# Patient Record
Sex: MALE | Race: WHITE | Employment: OTHER | URBAN - METROPOLITAN AREA
[De-identification: names, ages, dates, MRNs, and addresses within clinical notes are randomized per-mention and may not be internally consistent; named-entity substitution may affect disease eponyms.]

---

## 2022-12-07 ENCOUNTER — APPOINTMENT (OUTPATIENT)
Dept: GENERAL RADIOLOGY | Age: 42
End: 2022-12-07
Payer: COMMERCIAL

## 2022-12-07 ENCOUNTER — HOSPITAL ENCOUNTER (EMERGENCY)
Age: 42
Discharge: HOME | End: 2022-12-07
Attending: EMERGENCY MEDICINE
Payer: COMMERCIAL

## 2022-12-07 VITALS
OXYGEN SATURATION: 99 % | TEMPERATURE: 97.8 F | HEART RATE: 99 BPM | BODY MASS INDEX: 26.34 KG/M2 | HEIGHT: 75 IN | WEIGHT: 211.86 LBS | RESPIRATION RATE: 16 BRPM | DIASTOLIC BLOOD PRESSURE: 79 MMHG | SYSTOLIC BLOOD PRESSURE: 129 MMHG

## 2022-12-07 DIAGNOSIS — S61.310A LACERATION OF RIGHT INDEX FINGER WITHOUT FOREIGN BODY WITH DAMAGE TO NAIL, INITIAL ENCOUNTER: Primary | ICD-10-CM

## 2022-12-07 PROCEDURE — 99283 EMERGENCY DEPT VISIT LOW MDM: CPT

## 2022-12-07 PROCEDURE — 73140 X-RAY EXAM OF FINGER(S): CPT

## 2022-12-07 PROCEDURE — 12001 RPR S/N/AX/GEN/TRNK 2.5CM/<: CPT

## 2022-12-07 RX ORDER — DEXTROAMPHETAMINE SACCHARATE, AMPHETAMINE ASPARTATE, DEXTROAMPHETAMINE SULFATE AND AMPHETAMINE SULFATE 3.75; 3.75; 3.75; 3.75 MG/1; MG/1; MG/1; MG/1
15 TABLET ORAL DAILY
COMMUNITY

## 2022-12-07 RX ORDER — SERTRALINE HYDROCHLORIDE 100 MG/1
100 TABLET, FILM COATED ORAL DAILY
COMMUNITY

## 2022-12-07 RX ORDER — HYDROXYZINE PAMOATE 25 MG/1
50 CAPSULE ORAL PRN
COMMUNITY

## 2022-12-07 ASSESSMENT — PAIN DESCRIPTION - FREQUENCY: FREQUENCY: CONTINUOUS

## 2022-12-07 ASSESSMENT — PAIN - FUNCTIONAL ASSESSMENT
PAIN_FUNCTIONAL_ASSESSMENT: 0-10
PAIN_FUNCTIONAL_ASSESSMENT: 0-10

## 2022-12-07 ASSESSMENT — PAIN DESCRIPTION - PAIN TYPE: TYPE: ACUTE PAIN

## 2022-12-07 ASSESSMENT — PAIN SCALES - GENERAL
PAINLEVEL_OUTOF10: 0
PAINLEVEL_OUTOF10: 8

## 2022-12-07 ASSESSMENT — PAIN DESCRIPTION - ORIENTATION: ORIENTATION: RIGHT

## 2022-12-07 ASSESSMENT — PAIN DESCRIPTION - DESCRIPTORS: DESCRIPTORS: ACHING

## 2022-12-07 ASSESSMENT — PAIN DESCRIPTION - LOCATION: LOCATION: FINGER (COMMENT WHICH ONE)

## 2022-12-07 NOTE — DISCHARGE INSTRUCTIONS
Follow-up in 10 to 14 days for removal of sutures. Clean wound twice daily after the first 24 hours. You can cover it with a Band-Aid during the day to keep it clean and dry. You can leave it open to the air at night so that the wound will dry out. Follow-up in 10 to 14 days for removal of sutures. Return at anytime for redness, drainage, fever, signs of infection.

## 2022-12-07 NOTE — ED PROVIDER NOTES
[facility]  eMERGENCY dEPARTMENT eNCOUnter      Pt Name: Lizzie Crowell  MRN: [id number]  [place] 1980  Date of evaluation: 12/7/2022  Provider: Loree Mayfield MD    CHIEF COMPLAINT       Chief Complaint   Patient presents with    Finger Injury     Smashed right index finger with table saw. Laceration tip right index finger          HISTORY OF PRESENT ILLNESS  (Location/Symptom, Timing/Onset, Context/Setting, Quality, Duration, Modifying Factors, Severity.)   Lizzie Crowell is a 36 y.o. male who presents to the emergency department complaining of a laceration to his right index finger sustained at home just prior to coming in. He was using a table saw. The sawblade was turned off but it was still moving. He smashed his tip of his finger between the blade and the guard. He has an injury to the tip of his right index finger. Nursing Notes were reviewed and I agree. REVIEW OF SYSTEMS    (2-9 systems for level 4, 10 or more for level 5)     Musculoskeletal: Pain in the tip of the right index finger. Skin: Laceration of the tip of the right index finger. Neuro: No numbness tingling or weakness. Except as noted above the remainder of the review of systems was reviewed and negative. PAST MEDICAL HISTORY   History reviewed. No pertinent past medical history. SURGICAL HISTORY           Procedure Laterality Date    ARM SURGERY Left     SHOULDER SURGERY Right        CURRENT MEDICATIONS       Previous Medications    AMPHETAMINE-DEXTROAMPHETAMINE (ADDERALL) 15 MG TABLET    Take 15 mg by mouth daily. HYDROXYZINE PAMOATE (VISTARIL) 25 MG CAPSULE    Take 50 mg by mouth as needed for Itching    SERTRALINE (ZOLOFT) 100 MG TABLET    Take 100 mg by mouth daily       ALLERGIES     Patient has no known allergies. FAMILY HISTORY     History reviewed. No pertinent family history. No family status information on file.         SOCIAL HISTORY      reports that he has been smoking cigarettes. He has never used smokeless tobacco. He reports current alcohol use. He reports that he does not currently use drugs. PHYSICAL EXAM    (up to 7 for level 4, 8 or more for level 5)     ED Triage Vitals   BP Temp Temp src Pulse Resp SpO2 Height Weight   -- -- -- -- -- -- -- --       General: Alert male no acute distress. Musculoskeletal: There is no swelling or deformity of the right wrist, right hand, digits in the right hand. There is tenderness to the distal phalanx of the right index finger. He has intact extension of the MCP, PIP, and DIP of the index finger. He has intact flexion at the MCP, PIP, DIP. Full range of motion of all other digits. Skin: There is a 1 cm laceration over the ulnar aspect of the distal phalanx of the right index finger about midway down the nail plate. There is a fracture through the nail plate on the dorsal radial aspect of the distal nail plate diagonally situated as pictured below. Neuro: Intact motor function sensation right upper extremity. Dorsal Right Index    Radial Right Index  DIAGNOSTIC RESULTS     RADIOLOGY:   Non-plain film images such as CT, Ultrasound and MRI are read by the radiologist. Plain radiographic images are visualized and preliminarily interpreted by James Andino MD with the below findings:      Interpretation per the Radiologist below, if available at the time of this note:    XR FINGER RIGHT (MIN 2 VIEWS)   Final Result   Laceration of the tip of the index finger. No radiopaque foreign body. No   fracture. LABS:  Labs Reviewed - No data to display    All other labs were within normal range or not returned as of this dictation.     EMERGENCY DEPARTMENT COURSE and DIFFERENTIAL DIAGNOSIS/MDM:   Vitals:    Vitals:    12/07/22 1453   BP: 129/79   Pulse: 99   Resp: 16   Temp: 97.8 °F (36.6 °C)   TempSrc: Tympanic   SpO2: 99%   Weight: 211 lb 13.8 oz (96.1 kg)   Height: 6' 3\" (1.905 m)       This patient presents with an injury to the tip of his right index finger. He smashed it between the guard on a table saw on the blade. The blade had been turned off but it was still spinning. He has a fracture to the distal nail plate with a laceration that extends onto the radial aspect of the distal phalanx as pictured above. His x-ray shows no evidence of fracture or foreign body. A metacarpal block was done. The distal part of the nail plate was debrided. The skin was closed with five 4-0 nylon sutures. The nailbed was closed with two 5-0 chromic sutures. A dressing was placed. His tetanus is up-to-date.   He will follow-up in 10 to 14 days for suture removal.  He will return as needed for any redness, drainage, signs of infection    PROCEDURES:  Lac Repair    Date/Time: 12/7/2022 3:28 PM  Performed by: Joaquin Jasso MD  Authorized by: Joaquin Jasso MD     Consent:     Consent obtained:  Verbal    Consent given by:  Patient    Risks, benefits, and alternatives were discussed: yes      Risks discussed:  Infection, need for additional repair, nerve damage, poor wound healing, poor cosmetic result, pain, retained foreign body, tendon damage and vascular damage  Universal protocol:     Procedure explained and questions answered to patient or proxy's satisfaction: yes      Test results available: yes      Patient identity confirmed:  Verbally with patient and arm band  Anesthesia:     Anesthesia method:  Nerve block    Block location:  Right index finger    Block needle gauge:  25 G    Block anesthetic:  Bupivacaine 0.5% w/o epi and lidocaine 1% w/o epi    Block technique:  Metacarpal    Block injection procedure:  Anatomic landmarks identified, introduced needle, incremental injection and negative aspiration for blood    Block outcome:  Anesthesia achieved  Laceration details:     Location:  Finger    Finger location:  R index finger    Length (cm):  1.5  Pre-procedure details:     Preparation: Patient was prepped and draped in usual sterile fashion and imaging obtained to evaluate for foreign bodies  Exploration:     Hemostasis achieved with:  Tourniquet    Imaging obtained: x-ray      Imaging outcome: foreign body not noted      Wound exploration: entire depth of wound visualized      Wound extent: no foreign bodies/material noted, no nerve damage noted, no tendon damage noted, no underlying fracture noted and no vascular damage noted      Contaminated: no    Treatment:     Area cleansed with:  Chlorhexidine and saline    Amount of cleaning:  Standard    Irrigation solution:  Sterile saline    Irrigation method:  Syringe    Visualized foreign bodies/material removed: no      Debridement:  None  Skin repair:     Repair method:  Sutures    Suture size:  4-0    Suture material:  Nylon    Suture technique:  Simple interrupted    Number of sutures:  5  Repair type:     Repair type: Intermediate  Post-procedure details:     Dressing:  Antibiotic ointment and adhesive bandage    Procedure completion:  Tolerated well, no immediate complications  Comments:      Distal nail plate debrided. Two 5-0 chromic sutures placed in the distal nailbed. FINAL IMPRESSION      1.  Laceration of right index finger without foreign body with damage to nail, initial encounter          DISPOSITION/PLAN   DISPOSITION Decision To Discharge 12/07/2022 03:25:15 PM      PATIENT REFERRED TO:  Family MD    In 10 days  For suture removal    DISCHARGE MEDICATIONS:  New Prescriptions    No medications on file       (Please note that portions of this note were completed with a voice recognition program.  Efforts were made to edit the dictations but occasionally words are mis-transcribed.)    Qian Doan MD  Attending Emergency Physician        Nilay Carroll MD  12/07/22 3620

## 2022-12-07 NOTE — ED NOTES
Dr. Danielle Ny cleaned and repaired right index finger laceration. Placed ATB ointment, nonstick dressing, and tube dressing on right index finger.       [place] Ryland, DESHAWN  12/07/22 1931